# Patient Record
Sex: MALE | Race: WHITE | NOT HISPANIC OR LATINO | Employment: OTHER | URBAN - NONMETROPOLITAN AREA
[De-identification: names, ages, dates, MRNs, and addresses within clinical notes are randomized per-mention and may not be internally consistent; named-entity substitution may affect disease eponyms.]

---

## 2017-02-17 ENCOUNTER — OFFICE VISIT (OUTPATIENT)
Dept: CARDIOLOGY | Facility: CLINIC | Age: 72
End: 2017-02-17
Payer: MEDICARE

## 2017-02-17 VITALS
SYSTOLIC BLOOD PRESSURE: 104 MMHG | DIASTOLIC BLOOD PRESSURE: 60 MMHG | HEART RATE: 63 BPM | OXYGEN SATURATION: 95 % | HEIGHT: 68 IN | WEIGHT: 149 LBS | BODY MASS INDEX: 22.58 KG/M2

## 2017-02-17 DIAGNOSIS — J44.9 CHRONIC OBSTRUCTIVE PULMONARY DISEASE, UNSPECIFIED COPD TYPE (HCC): ICD-10-CM

## 2017-02-17 DIAGNOSIS — Z95.5 PRESENCE OF BARE METAL STENT IN LEFT CIRCUMFLEX CORONARY ARTERY: ICD-10-CM

## 2017-02-17 DIAGNOSIS — I25.10 CORONARY ARTERY DISEASE INVOLVING NATIVE CORONARY ARTERY OF NATIVE HEART WITHOUT ANGINA PECTORIS: ICD-10-CM

## 2017-02-17 DIAGNOSIS — D64.9 NORMOCYTIC ANEMIA: ICD-10-CM

## 2017-02-17 DIAGNOSIS — I21.21 ST ELEVATION MYOCARDIAL INFARCTION INVOLVING LEFT CIRCUMFLEX CORONARY ARTERY (HCC): ICD-10-CM

## 2017-02-17 DIAGNOSIS — I48.0 PAROXYSMAL ATRIAL FIBRILLATION (HCC): ICD-10-CM

## 2017-02-17 PROCEDURE — G8598 ASA/ANTIPLAT THER USED: HCPCS | Performed by: INTERNAL MEDICINE

## 2017-02-17 PROCEDURE — 4004F PT TOBACCO SCREEN RCVD TLK: CPT | Performed by: INTERNAL MEDICINE

## 2017-02-17 PROCEDURE — 1101F PT FALLS ASSESS-DOCD LE1/YR: CPT | Mod: 8P | Performed by: INTERNAL MEDICINE

## 2017-02-17 PROCEDURE — G8484 FLU IMMUNIZE NO ADMIN: HCPCS | Performed by: INTERNAL MEDICINE

## 2017-02-17 PROCEDURE — G8432 DEP SCR NOT DOC, RNG: HCPCS | Performed by: INTERNAL MEDICINE

## 2017-02-17 PROCEDURE — G8419 CALC BMI OUT NRM PARAM NOF/U: HCPCS | Performed by: INTERNAL MEDICINE

## 2017-02-17 PROCEDURE — 3017F COLORECTAL CA SCREEN DOC REV: CPT | Mod: 8P | Performed by: INTERNAL MEDICINE

## 2017-02-17 PROCEDURE — 4040F PNEUMOC VAC/ADMIN/RCVD: CPT | Mod: 8P | Performed by: INTERNAL MEDICINE

## 2017-02-17 PROCEDURE — 99215 OFFICE O/P EST HI 40 MIN: CPT | Performed by: INTERNAL MEDICINE

## 2017-02-17 RX ORDER — AMIODARONE HYDROCHLORIDE 200 MG/1
200 TABLET ORAL DAILY
Qty: 60 TAB | Refills: 11 | Status: SHIPPED | OUTPATIENT
Start: 2017-02-17

## 2017-02-17 ASSESSMENT — ENCOUNTER SYMPTOMS
MUSCULOSKELETAL NEGATIVE: 1
NEUROLOGICAL NEGATIVE: 1
CARDIOVASCULAR NEGATIVE: 1
RESPIRATORY NEGATIVE: 1
SPUTUM PRODUCTION: 0
CHILLS: 0
SORE THROAT: 0
HEMOPTYSIS: 0
COUGH: 0
SHORTNESS OF BREATH: 0
BRUISES/BLEEDS EASILY: 0
WHEEZING: 0
PND: 0
EYES NEGATIVE: 1
PALPITATIONS: 0
LOSS OF CONSCIOUSNESS: 0
GASTROINTESTINAL NEGATIVE: 1
DIZZINESS: 0
CONSTITUTIONAL NEGATIVE: 1
CLAUDICATION: 0
STRIDOR: 0
ORTHOPNEA: 0
WEAKNESS: 0
FEVER: 0

## 2017-02-17 NOTE — PROGRESS NOTES
Subjective:   Zion Love is a 71 y.o. male who presents today for a follow up for his CAD. Recent admission for urosepsis and was in atrial fibrillation during his stay.  He was discharged and readmitted for atrial fibrillation with RVR.  He was told to stop his clopidogrel and asa and start xarelto.  A few visits back we stopped his amiodarone.    Past Medical History   Diagnosis Date   • STEMI (ST elevation myocardial infarction) (CMS-HCC) 8/13/2016     Acute inferior posterior wall myocardial infarction, status post thrombolytic therapy with 99% residual stenosis with thrombus in the mid portion of the large left circumflex artery. Status post stenting of the left circumflex artery with a 4.5x18 mm bare metal Ultra stent with 0% residual stenosis, MYRA-3 flow.  30-40% left main stenosis and 50-60% stenosis of the mid medium-sized ramus intermediate artery.  Apical inferior wall akinesis.  Ejection fraction is 40-45%.     • Coronary artery disease involving native coronary artery 8/13/2016     Status post stenting of the left circumflex artery with a 4.5x18 mm bare metal Ultra stent with 0% residual stenosis, MYRA-3 flow.  30-40% left main stenosis and 50-60% stenosis of the mid medium-sized ramus intermediate artery.     • Presence of bare metal stent in left circumflex coronary artery 8/13/2016     4.5x18 mm Ultra stent    • Paroxysmal atrial fibrillation (CMS-HCC) 8/14/2016     During post MI recovery, treated with amiodarone.     Past Surgical History   Procedure Laterality Date   • Tonsillectomy     • Other orthopedic surgery Left      Left wrist synovial cyst resection     Family History   Problem Relation Age of Onset   • Alzheimer's Disease Mother    • Other Father      History   Smoking status   • Current Every Day Smoker -- 0.25 packs/day for 60 years   • Types: Cigarettes   Smokeless tobacco   • Never Used     No Known Allergies  Outpatient Encounter Prescriptions as of 2/17/2017   Medication Sig  "Dispense Refill   • temazepam (RESTORIL) 15 MG Cap Take 15 mg by mouth at bedtime as needed for Sleep.     • clopidogrel (PLAVIX) 75 MG Tab Take 1 Tab by mouth every day. 30 Tab 11   • aspirin (ASA) 81 MG Chew Tab chewable tablet Take 1 Tab by mouth every day. 100 Tab 11   • amiodarone (CORDARONE) 200 MG Tab Take 1 Tab by mouth every day. 30 Tab 3   • atorvastatin (LIPITOR) 40 MG Tab Take 1 Tab by mouth every evening. 30 Tab 11   • metoprolol (LOPRESSOR) 25 MG Tab Take 1 Tab by mouth 2 Times a Day. 60 Tab 11   • oxycodone immediate-release (ROXICODONE) 5 MG Tab Take 1 Tab by mouth every four hours as needed. 30 Tab 0   • acetaminophen (TYLENOL) 325 MG Tab Take 1 Tab by mouth every four hours as needed for Mild Pain. 30 Tab 0   • alprazolam (XANAX) 0.25 MG Tab Take 1 Tab by mouth every 12 hours as needed for Anxiety. 20 Tab 0     No facility-administered encounter medications on file as of 2/17/2017.     Review of Systems   Constitutional: Negative.  Negative for fever, chills and malaise/fatigue.   HENT: Negative.  Negative for sore throat.    Eyes: Negative.    Respiratory: Negative.  Negative for cough, hemoptysis, sputum production, shortness of breath, wheezing and stridor.    Cardiovascular: Negative.  Negative for chest pain, palpitations, orthopnea, claudication, leg swelling and PND.   Gastrointestinal: Negative.    Genitourinary: Negative.    Musculoskeletal: Negative.    Skin: Negative.    Neurological: Negative.  Negative for dizziness, loss of consciousness and weakness.   Endo/Heme/Allergies: Negative.  Does not bruise/bleed easily.   All other systems reviewed and are negative.       Objective:   /60 mmHg  Pulse 63  Ht 1.727 m (5' 8\")  Wt 67.586 kg (149 lb)  BMI 22.66 kg/m2  SpO2 95%    Physical Exam   Constitutional: He is oriented to person, place, and time. He appears well-developed and well-nourished. No distress.   HENT:   Head: Normocephalic.   Mouth/Throat: Oropharynx is clear and " moist.   Eyes: EOM are normal. Pupils are equal, round, and reactive to light. Right eye exhibits no discharge. Left eye exhibits no discharge. No scleral icterus.   Neck: Normal range of motion. Neck supple. No JVD present. No tracheal deviation present.   Cardiovascular: Normal rate, regular rhythm, S1 normal, S2 normal, normal heart sounds, intact distal pulses and normal pulses.  Exam reveals no gallop, no S3, no S4 and no friction rub.    No murmur heard.   No systolic murmur is present    No diastolic murmur is present   Pulses:       Carotid pulses are 2+ on the right side, and 2+ on the left side.       Radial pulses are 2+ on the right side, and 2+ on the left side.        Dorsalis pedis pulses are 2+ on the right side, and 2+ on the left side.        Posterior tibial pulses are 2+ on the right side, and 2+ on the left side.   Pulmonary/Chest: Effort normal and breath sounds normal. No respiratory distress. He has no wheezes. He has no rales.   Abdominal: Soft. Bowel sounds are normal. He exhibits no distension and no mass. There is no tenderness. There is no rebound and no guarding.   Musculoskeletal: He exhibits no edema.   Neurological: He is alert and oriented to person, place, and time. No cranial nerve deficit.   Skin: Skin is warm and dry. He is not diaphoretic. No pallor.   Psychiatric: He has a normal mood and affect. His behavior is normal. Judgment and thought content normal.   Nursing note and vitals reviewed.      Assessment:     1. ST elevation myocardial infarction involving left circumflex coronary artery (CMS-[facility])     2. Paroxysmal atrial fibrillation (CMS-[facility])     3. Presence of bare metal stent in left circumflex coronary artery     4. Coronary artery disease involving native coronary artery of native heart without angina pectoris     5. Normocytic anemia     6. Chronic obstructive pulmonary disease, unspecified COPD type (CMS-[facility])         Medical Decision Making:  Today's Assessment /  Status / Plan:     70 y/o M with CAD s/p inferior stemi and recurrent a-fib.  I offered antiarrythmic therapy but he does not want to be admitted for monitoring.      1. STEMI, inferior    - restart clopidogrel    - cont atorva 40    2. HLD    - per above    3. A-fib    - cont xarelto    - restart amio    4. Tob Use    - stop smoking    Thank for you allowing me to take part in your patient's care, please call should you have any questions or would like to discuss this patient.

## 2017-02-17 NOTE — Clinical Note
[facility], [place], operated by [facility]   [address] [place] [address]  Phone: [phone]  Fax: [phone]              Zion Love  1945    Encounter Date: 2/17/2017    Flaco Robertson M.D.          PROGRESS NOTE:  Subjective:   Zion Love is a 71 y.o. male who presents today for a follow up for his CAD. Recent admission for urosepsis and was in atrial fibrillation during his stay.  He was discharged and readmitted for atrial fibrillation with RVR.  He was told to stop his clopidogrel and asa and start xarelto.  A few visits back we stopped his amiodarone.    Past Medical History   Diagnosis Date   • STEMI (ST elevation myocardial infarction) (CMS-HCC) 8/13/2016     Acute inferior posterior wall myocardial infarction, status post thrombolytic therapy with 99% residual stenosis with thrombus in the mid portion of the large left circumflex artery. Status post stenting of the left circumflex artery with a 4.5x18 mm bare metal Ultra stent with 0% residual stenosis, MYRA-3 flow.  30-40% left main stenosis and 50-60% stenosis of the mid medium-sized ramus intermediate artery.  Apical inferior wall akinesis.  Ejection fraction is 40-45%.     • Coronary artery disease involving native coronary artery 8/13/2016     Status post stenting of the left circumflex artery with a 4.5x18 mm bare metal Ultra stent with 0% residual stenosis, MYRA-3 flow.  30-40% left main stenosis and 50-60% stenosis of the mid medium-sized ramus intermediate artery.     • Presence of bare metal stent in left circumflex coronary artery 8/13/2016     4.5x18 mm Ultra stent    • Paroxysmal atrial fibrillation (CMS-HCC) 8/14/2016     During post MI recovery, treated with amiodarone.     Past Surgical History   Procedure Laterality Date   • Tonsillectomy     • Other orthopedic surgery Left      Left wrist synovial cyst resection     Family History   Problem Relation Age of Onset   • Alzheimer's Disease  "Mother    • Other Father      History   Smoking status   • Current Every Day Smoker -- 0.25 packs/day for 60 years   • Types: Cigarettes   Smokeless tobacco   • Never Used     No Known Allergies  Outpatient Encounter Prescriptions as of 2/17/2017   Medication Sig Dispense Refill   • temazepam (RESTORIL) 15 MG Cap Take 15 mg by mouth at bedtime as needed for Sleep.     • clopidogrel (PLAVIX) 75 MG Tab Take 1 Tab by mouth every day. 30 Tab 11   • aspirin (ASA) 81 MG Chew Tab chewable tablet Take 1 Tab by mouth every day. 100 Tab 11   • amiodarone (CORDARONE) 200 MG Tab Take 1 Tab by mouth every day. 30 Tab 3   • atorvastatin (LIPITOR) 40 MG Tab Take 1 Tab by mouth every evening. 30 Tab 11   • metoprolol (LOPRESSOR) 25 MG Tab Take 1 Tab by mouth 2 Times a Day. 60 Tab 11   • oxycodone immediate-release (ROXICODONE) 5 MG Tab Take 1 Tab by mouth every four hours as needed. 30 Tab 0   • acetaminophen (TYLENOL) 325 MG Tab Take 1 Tab by mouth every four hours as needed for Mild Pain. 30 Tab 0   • alprazolam (XANAX) 0.25 MG Tab Take 1 Tab by mouth every 12 hours as needed for Anxiety. 20 Tab 0     No facility-administered encounter medications on file as of 2/17/2017.     Review of Systems   Constitutional: Negative.  Negative for fever, chills and malaise/fatigue.   HENT: Negative.  Negative for sore throat.    Eyes: Negative.    Respiratory: Negative.  Negative for cough, hemoptysis, sputum production, shortness of breath, wheezing and stridor.    Cardiovascular: Negative.  Negative for chest pain, palpitations, orthopnea, claudication, leg swelling and PND.   Gastrointestinal: Negative.    Genitourinary: Negative.    Musculoskeletal: Negative.    Skin: Negative.    Neurological: Negative.  Negative for dizziness, loss of consciousness and weakness.   Endo/Heme/Allergies: Negative.  Does not bruise/bleed easily.   All other systems reviewed and are negative.       Objective:   /60 mmHg  Pulse 63  Ht 1.727 m (5' 8\") "  Wt 67.586 kg (149 lb)  BMI 22.66 kg/m2  SpO2 95%    Physical Exam   Constitutional: He is oriented to person, place, and time. He appears well-developed and well-nourished. No distress.   HENT:   Head: Normocephalic.   Mouth/Throat: Oropharynx is clear and moist.   Eyes: EOM are normal. Pupils are equal, round, and reactive to light. Right eye exhibits no discharge. Left eye exhibits no discharge. No scleral icterus.   Neck: Normal range of motion. Neck supple. No JVD present. No tracheal deviation present.   Cardiovascular: Normal rate, regular rhythm, S1 normal, S2 normal, normal heart sounds, intact distal pulses and normal pulses.  Exam reveals no gallop, no S3, no S4 and no friction rub.    No murmur heard.   No systolic murmur is present    No diastolic murmur is present   Pulses:       Carotid pulses are 2+ on the right side, and 2+ on the left side.       Radial pulses are 2+ on the right side, and 2+ on the left side.        Dorsalis pedis pulses are 2+ on the right side, and 2+ on the left side.        Posterior tibial pulses are 2+ on the right side, and 2+ on the left side.   Pulmonary/Chest: Effort normal and breath sounds normal. No respiratory distress. He has no wheezes. He has no rales.   Abdominal: Soft. Bowel sounds are normal. He exhibits no distension and no mass. There is no tenderness. There is no rebound and no guarding.   Musculoskeletal: He exhibits no edema.   Neurological: He is alert and oriented to person, place, and time. No cranial nerve deficit.   Skin: Skin is warm and dry. He is not diaphoretic. No pallor.   Psychiatric: He has a normal mood and affect. His behavior is normal. Judgment and thought content normal.   Nursing note and vitals reviewed.      Assessment:     1. ST elevation myocardial infarction involving left circumflex coronary artery (CMS-HCC)     2. Paroxysmal atrial fibrillation (CMS-HCC)     3. Presence of bare metal stent in left circumflex coronary artery        4. Coronary artery disease involving native coronary artery of native heart without angina pectoris     5. Normocytic anemia     6. Chronic obstructive pulmonary disease, unspecified COPD type (CMS-HCC)         Medical Decision Making:  Today's Assessment / Status / Plan:     70 y/o M with CAD s/p inferior stemi and recurrent a-fib.  I offered antiarrythmic therapy but he does not want to be admitted for monitoring.      1. STEMI, inferior    - restart clopidogrel    - cont atorva 40    2. HLD    - per above    3. A-fib    - cont xarelto    - restart amio    4. Tob Use    - stop smoking    Thank for you allowing me to take part in your patient's care, please call should you have any questions or would like to discuss this patient.        Macrina Arenas M.D.  [address] [facility] [address]  VIA Facsimile: [phone]

## 2017-02-27 ENCOUNTER — TELEPHONE (OUTPATIENT)
Dept: CARDIOLOGY | Facility: MEDICAL CENTER | Age: 72
End: 2017-02-27

## 2017-02-27 NOTE — TELEPHONE ENCOUNTER
Mckenzie called back and verified medications and dosing of Amiodarone as directed on 2/17.  400 mg BID x 14 days, 400 mg daily x 14 days then 200mg daily.

## 2017-02-27 NOTE — TELEPHONE ENCOUNTER
"Message left for daughter \"Mckenzie\" to call  Per office note Dr. Robertson pt was instructed to  Restart Clopidogrel  Continue Xarelto  Restart Amiodarone.  "

## 2017-02-27 NOTE — TELEPHONE ENCOUNTER
----- Message from Brenda Norwood, Med Ass't sent at 2/27/2017 10:27 AM PST -----  Regarding: Medication Question   Contact: [phone]  Patient of RO daughter Mckenzie would like to confirm that patient is to start Plavix and Amiodarone (CORDARONE) 200 MG Tab twice a day.    Thank you

## 2017-05-12 ENCOUNTER — OFFICE VISIT (OUTPATIENT)
Dept: CARDIOLOGY | Facility: CLINIC | Age: 72
End: 2017-05-12
Payer: MEDICARE

## 2017-05-12 VITALS
WEIGHT: 141 LBS | HEART RATE: 60 BPM | HEIGHT: 68 IN | DIASTOLIC BLOOD PRESSURE: 60 MMHG | SYSTOLIC BLOOD PRESSURE: 100 MMHG | BODY MASS INDEX: 21.37 KG/M2

## 2017-05-12 DIAGNOSIS — I25.10 CORONARY ARTERY DISEASE INVOLVING NATIVE CORONARY ARTERY OF NATIVE HEART WITHOUT ANGINA PECTORIS: ICD-10-CM

## 2017-05-12 DIAGNOSIS — J44.9 CHRONIC OBSTRUCTIVE PULMONARY DISEASE, UNSPECIFIED COPD TYPE (HCC): ICD-10-CM

## 2017-05-12 DIAGNOSIS — Z95.5 PRESENCE OF BARE METAL STENT IN LEFT CIRCUMFLEX CORONARY ARTERY: ICD-10-CM

## 2017-05-12 DIAGNOSIS — D64.9 NORMOCYTIC ANEMIA: ICD-10-CM

## 2017-05-12 DIAGNOSIS — I48.0 PAROXYSMAL ATRIAL FIBRILLATION (HCC): ICD-10-CM

## 2017-05-12 DIAGNOSIS — I21.21 ST ELEVATION MYOCARDIAL INFARCTION INVOLVING LEFT CIRCUMFLEX CORONARY ARTERY (HCC): ICD-10-CM

## 2017-05-12 PROCEDURE — G8598 ASA/ANTIPLAT THER USED: HCPCS | Performed by: INTERNAL MEDICINE

## 2017-05-12 PROCEDURE — 3017F COLORECTAL CA SCREEN DOC REV: CPT | Mod: 8P | Performed by: INTERNAL MEDICINE

## 2017-05-12 PROCEDURE — 4004F PT TOBACCO SCREEN RCVD TLK: CPT | Performed by: INTERNAL MEDICINE

## 2017-05-12 PROCEDURE — G8420 CALC BMI NORM PARAMETERS: HCPCS | Performed by: INTERNAL MEDICINE

## 2017-05-12 PROCEDURE — 99214 OFFICE O/P EST MOD 30 MIN: CPT | Performed by: INTERNAL MEDICINE

## 2017-05-12 PROCEDURE — 4040F PNEUMOC VAC/ADMIN/RCVD: CPT | Mod: 8P | Performed by: INTERNAL MEDICINE

## 2017-05-12 PROCEDURE — G8432 DEP SCR NOT DOC, RNG: HCPCS | Performed by: INTERNAL MEDICINE

## 2017-05-12 PROCEDURE — 1101F PT FALLS ASSESS-DOCD LE1/YR: CPT | Mod: 8P | Performed by: INTERNAL MEDICINE

## 2017-05-12 ASSESSMENT — ENCOUNTER SYMPTOMS
CLAUDICATION: 0
BRUISES/BLEEDS EASILY: 0
STRIDOR: 0
WHEEZING: 0
MUSCULOSKELETAL NEGATIVE: 1
RESPIRATORY NEGATIVE: 1
CHILLS: 0
HEMOPTYSIS: 0
FEVER: 0
SORE THROAT: 0
DIZZINESS: 0
EYES NEGATIVE: 1
PND: 0
PALPITATIONS: 0
GASTROINTESTINAL NEGATIVE: 1
NEUROLOGICAL NEGATIVE: 1
ORTHOPNEA: 0
COUGH: 0
LOSS OF CONSCIOUSNESS: 0
WEAKNESS: 0
CARDIOVASCULAR NEGATIVE: 1
CONSTITUTIONAL NEGATIVE: 1
SHORTNESS OF BREATH: 0
SPUTUM PRODUCTION: 0

## 2017-05-12 NOTE — PROGRESS NOTES
Subjective:   Zion Love is a 71 y.o. male who presents today for his STEMI and his a-fib.  He is taking his eliquis but having issues affording it.  He wants to knowif he needs to be taking it.  He restarted his amio for his a-fib.  He is still smoking.  Otherwise no chest pain, PND, orthopnea.    Past Medical History   Diagnosis Date   • STEMI (ST elevation myocardial infarction) (CMS-HCC) 8/13/2016     Acute inferior posterior wall myocardial infarction, status post thrombolytic therapy with 99% residual stenosis with thrombus in the mid portion of the large left circumflex artery. Status post stenting of the left circumflex artery with a 4.5x18 mm bare metal Ultra stent with 0% residual stenosis, MYRA-3 flow.  30-40% left main stenosis and 50-60% stenosis of the mid medium-sized ramus intermediate artery.  Apical inferior wall akinesis.  Ejection fraction is 40-45%.     • Coronary artery disease involving native coronary artery 8/13/2016     Status post stenting of the left circumflex artery with a 4.5x18 mm bare metal Ultra stent with 0% residual stenosis, MYRA-3 flow.  30-40% left main stenosis and 50-60% stenosis of the mid medium-sized ramus intermediate artery.     • Presence of bare metal stent in left circumflex coronary artery 8/13/2016     4.5x18 mm Ultra stent    • Paroxysmal atrial fibrillation (CMS-HCC) 8/14/2016     During post MI recovery, treated with amiodarone.     Past Surgical History   Procedure Laterality Date   • Tonsillectomy     • Other orthopedic surgery Left      Left wrist synovial cyst resection     Family History   Problem Relation Age of Onset   • Alzheimer's Disease Mother    • Other Father      History   Smoking status   • Current Every Day Smoker -- 0.25 packs/day for 60 years   • Types: Cigarettes   Smokeless tobacco   • Never Used     No Known Allergies  Outpatient Encounter Prescriptions as of 5/12/2017   Medication Sig Dispense Refill   • clopidogrel (PLAVIX) 75 MG Tab Take  "1 Tab by mouth every day. 30 Tab 11   • amiodarone (CORDARONE) 200 MG Tab Take 1 Tab by mouth every day. 30 Tab 3   • atorvastatin (LIPITOR) 40 MG Tab Take 1 Tab by mouth every evening. 30 Tab 11   • metoprolol (LOPRESSOR) 25 MG Tab Take 1 Tab by mouth 2 Times a Day. (Patient taking differently: Take 37.5 mg by mouth 2 Times a Day.) 60 Tab 11   • acetaminophen (TYLENOL) 325 MG Tab Take 1 Tab by mouth every four hours as needed for Mild Pain. 30 Tab 0   • amiodarone (CORDARONE) 200 MG Tab Take 1 Tab by mouth every day. 60 Tab 11   • temazepam (RESTORIL) 15 MG Cap Take 15 mg by mouth at bedtime as needed for Sleep.     • aspirin (ASA) 81 MG Chew Tab chewable tablet Take 1 Tab by mouth every day. 100 Tab 11   • oxycodone immediate-release (ROXICODONE) 5 MG Tab Take 1 Tab by mouth every four hours as needed. 30 Tab 0   • alprazolam (XANAX) 0.25 MG Tab Take 1 Tab by mouth every 12 hours as needed for Anxiety. 20 Tab 0     No facility-administered encounter medications on file as of 5/12/2017.     Review of Systems   Constitutional: Negative.  Negative for fever, chills and malaise/fatigue.   HENT: Negative.  Negative for sore throat.    Eyes: Negative.    Respiratory: Negative.  Negative for cough, hemoptysis, sputum production, shortness of breath, wheezing and stridor.    Cardiovascular: Negative.  Negative for chest pain, palpitations, orthopnea, claudication, leg swelling and PND.   Gastrointestinal: Negative.    Genitourinary: Negative.    Musculoskeletal: Negative.    Skin: Negative.    Neurological: Negative.  Negative for dizziness, loss of consciousness and weakness.   Endo/Heme/Allergies: Negative.  Does not bruise/bleed easily.   All other systems reviewed and are negative.       Objective:   /60 mmHg  Pulse 60  Ht 1.727 m (5' 8\")  Wt 63.957 kg (141 lb)  BMI 21.44 kg/m2    Physical Exam   Constitutional: He is oriented to person, place, and time. He appears well-developed and well-nourished. No " distress.   HENT:   Head: Normocephalic.   Mouth/Throat: Oropharynx is clear and moist.   Eyes: EOM are normal. Pupils are equal, round, and reactive to light. Right eye exhibits no discharge. Left eye exhibits no discharge. No scleral icterus.   Neck: Normal range of motion. Neck supple. No JVD present. No tracheal deviation present.   Cardiovascular: Normal rate, regular rhythm, S1 normal, S2 normal, normal heart sounds, intact distal pulses and normal pulses.  Exam reveals no gallop, no S3, no S4 and no friction rub.    No murmur heard.   No systolic murmur is present    No diastolic murmur is present   Pulses:       Carotid pulses are 2+ on the right side, and 2+ on the left side.       Radial pulses are 2+ on the right side, and 2+ on the left side.        Dorsalis pedis pulses are 2+ on the right side, and 2+ on the left side.        Posterior tibial pulses are 2+ on the right side, and 2+ on the left side.   Pulmonary/Chest: Effort normal and breath sounds normal. No respiratory distress. He has no wheezes. He has no rales.   Abdominal: Soft. Bowel sounds are normal. He exhibits no distension and no mass. There is no tenderness. There is no rebound and no guarding.   Musculoskeletal: He exhibits no edema.   Neurological: He is alert and oriented to person, place, and time. No cranial nerve deficit.   Skin: Skin is warm and dry. He is not diaphoretic. No pallor.   Psychiatric: He has a normal mood and affect. His behavior is normal. Judgment and thought content normal.   Nursing note and vitals reviewed.      Assessment:     1. ST elevation myocardial infarction involving left circumflex coronary artery (CMS-[facility])     2. Normocytic anemia     3. Chronic obstructive pulmonary disease, unspecified COPD type (CMS-[facility])     4. Coronary artery disease involving native coronary artery of native heart without angina pectoris     5. Paroxysmal atrial fibrillation (CMS-[facility])     6. Presence of bare metal stent in left  circumflex coronary artery         Medical Decision Making:  Today's Assessment / Status / Plan:     70 y/o M with p-a-fib and CAD s/ STEMI.  He will stay on cloipidogrel for one year.  I did give them a Rx for both dabigitran and apixaban to run through their insurance.  They will continue to work on the patient assisance program.  I will see him back in 6 months.    1. STEMI, inferior    - cont clopidogrel until 8/2017    - cont atorva 40    2. HLD    - per above    3. A-fib    - cont xarelto    - contn amio    - they will advise if he wants to switch    4. Tob Use    - stop smoking    Thank for you allowing me to take part in your patient's care, please call should you have any questions or would like to discuss this patient.

## 2017-05-12 NOTE — Clinical Note
[facility]   [address] [place] [address]  Phone: [phone]  Fax: [phone]              Zion Love  1945    Encounter Date: 5/12/2017    Flaco Robertson M.D.          PROGRESS NOTE:  Subjective:   Zion Love is a 71 y.o. male who presents today for his STEMI and his a-fib.  He is taking his eliquis but having issues affording it.  He wants to knowif he needs to be taking it.  He restarted his amio for his a-fib.  He is still smoking.  Otherwise no chest pain, PND, orthopnea.    Past Medical History   Diagnosis Date   • STEMI (ST elevation myocardial infarction) (CMS-HCC) 8/13/2016     Acute inferior posterior wall myocardial infarction, status post thrombolytic therapy with 99% residual stenosis with thrombus in the mid portion of the large left circumflex artery. Status post stenting of the left circumflex artery with a 4.5x18 mm bare metal Ultra stent with 0% residual stenosis, MYRA-3 flow.  30-40% left main stenosis and 50-60% stenosis of the mid medium-sized ramus intermediate artery.  Apical inferior wall akinesis.  Ejection fraction is 40-45%.     • Coronary artery disease involving native coronary artery 8/13/2016     Status post stenting of the left circumflex artery with a 4.5x18 mm bare metal Ultra stent with 0% residual stenosis, MYRA-3 flow.  30-40% left main stenosis and 50-60% stenosis of the mid medium-sized ramus intermediate artery.     • Presence of bare metal stent in left circumflex coronary artery 8/13/2016     4.5x18 mm Ultra stent    • Paroxysmal atrial fibrillation (CMS-HCC) 8/14/2016     During post MI recovery, treated with amiodarone.     Past Surgical History   Procedure Laterality Date   • Tonsillectomy     • Other orthopedic surgery Left      Left wrist synovial cyst resection     Family History   Problem Relation Age of Onset   • Alzheimer's Disease Mother    • Other Father      History   Smoking  status   • Current Every Day Smoker -- 0.25 packs/day for 60 years   • Types: Cigarettes   Smokeless tobacco   • Never Used     No Known Allergies  Outpatient Encounter Prescriptions as of 5/12/2017   Medication Sig Dispense Refill   • clopidogrel (PLAVIX) 75 MG Tab Take 1 Tab by mouth every day. 30 Tab 11   • amiodarone (CORDARONE) 200 MG Tab Take 1 Tab by mouth every day. 30 Tab 3   • atorvastatin (LIPITOR) 40 MG Tab Take 1 Tab by mouth every evening. 30 Tab 11   • metoprolol (LOPRESSOR) 25 MG Tab Take 1 Tab by mouth 2 Times a Day. (Patient taking differently: Take 37.5 mg by mouth 2 Times a Day.) 60 Tab 11   • acetaminophen (TYLENOL) 325 MG Tab Take 1 Tab by mouth every four hours as needed for Mild Pain. 30 Tab 0   • amiodarone (CORDARONE) 200 MG Tab Take 1 Tab by mouth every day. 60 Tab 11   • temazepam (RESTORIL) 15 MG Cap Take 15 mg by mouth at bedtime as needed for Sleep.     • aspirin (ASA) 81 MG Chew Tab chewable tablet Take 1 Tab by mouth every day. 100 Tab 11   • oxycodone immediate-release (ROXICODONE) 5 MG Tab Take 1 Tab by mouth every four hours as needed. 30 Tab 0   • alprazolam (XANAX) 0.25 MG Tab Take 1 Tab by mouth every 12 hours as needed for Anxiety. 20 Tab 0     No facility-administered encounter medications on file as of 5/12/2017.     Review of Systems   Constitutional: Negative.  Negative for fever, chills and malaise/fatigue.   HENT: Negative.  Negative for sore throat.    Eyes: Negative.    Respiratory: Negative.  Negative for cough, hemoptysis, sputum production, shortness of breath, wheezing and stridor.    Cardiovascular: Negative.  Negative for chest pain, palpitations, orthopnea, claudication, leg swelling and PND.   Gastrointestinal: Negative.    Genitourinary: Negative.    Musculoskeletal: Negative.    Skin: Negative.    Neurological: Negative.  Negative for dizziness, loss of consciousness and weakness.   Endo/Heme/Allergies: Negative.  Does not bruise/bleed easily.   All other  "systems reviewed and are negative.       Objective:   /60 mmHg  Pulse 60  Ht 1.727 m (5' 8\")  Wt 63.957 kg (141 lb)  BMI 21.44 kg/m2    Physical Exam   Constitutional: He is oriented to person, place, and time. He appears well-developed and well-nourished. No distress.   HENT:   Head: Normocephalic.   Mouth/Throat: Oropharynx is clear and moist.   Eyes: EOM are normal. Pupils are equal, round, and reactive to light. Right eye exhibits no discharge. Left eye exhibits no discharge. No scleral icterus.   Neck: Normal range of motion. Neck supple. No JVD present. No tracheal deviation present.   Cardiovascular: Normal rate, regular rhythm, S1 normal, S2 normal, normal heart sounds, intact distal pulses and normal pulses.  Exam reveals no gallop, no S3, no S4 and no friction rub.    No murmur heard.   No systolic murmur is present    No diastolic murmur is present   Pulses:       Carotid pulses are 2+ on the right side, and 2+ on the left side.       Radial pulses are 2+ on the right side, and 2+ on the left side.        Dorsalis pedis pulses are 2+ on the right side, and 2+ on the left side.        Posterior tibial pulses are 2+ on the right side, and 2+ on the left side.   Pulmonary/Chest: Effort normal and breath sounds normal. No respiratory distress. He has no wheezes. He has no rales.   Abdominal: Soft. Bowel sounds are normal. He exhibits no distension and no mass. There is no tenderness. There is no rebound and no guarding.   Musculoskeletal: He exhibits no edema.   Neurological: He is alert and oriented to person, place, and time. No cranial nerve deficit.   Skin: Skin is warm and dry. He is not diaphoretic. No pallor.   Psychiatric: He has a normal mood and affect. His behavior is normal. Judgment and thought content normal.   Nursing note and vitals reviewed.      Assessment:     1. ST elevation myocardial infarction involving left circumflex coronary artery (CMS-HCC)     2. Normocytic anemia     3. " Chronic obstructive pulmonary disease, unspecified COPD type (CMS-HCC)     4. Coronary artery disease involving native coronary artery of native heart without angina pectoris     5. Paroxysmal atrial fibrillation (CMS-HCC)     6. Presence of bare metal stent in left circumflex coronary artery         Medical Decision Making:  Today's Assessment / Status / Plan:     70 y/o M with p-a-fib and CAD s/ STEMI.  He will stay on cloipidogrel for one year.  I did give them a Rx for both dabigitran and apixaban to run through their insurance.  They will continue to work on the patient assisance program.  I will see him back in 6 months.    1. STEMI, inferior    - cont clopidogrel until 8/2017    - cont atorva 40    2. HLD    - per above    3. A-fib    - cont xarelto    - contn amio    - they will advise if he wants to switch    4. Tob Use    - stop smoking    Thank for you allowing me to take part in your patient's care, please call should you have any questions or would like to discuss this patient.      Macrina Arenas M.D.  [address] [facility] [address]  VIA Facsimile: [phone]

## 2017-05-15 ENCOUNTER — TELEPHONE (OUTPATIENT)
Dept: CARDIOLOGY | Facility: MEDICAL CENTER | Age: 72
End: 2017-05-15

## 2017-05-15 NOTE — TELEPHONE ENCOUNTER
----- Message from Marcos Styles, Med Ass't sent at 5/15/2017 11:03 AM PDT -----  Regarding: Medication Questions  Contact: [phone]  Eugene Boss Pt Zion's daughter Cheryle called with some questions re his medications he got on 5/12/17. She did request a call back at your earliest convenience.     Thanks!  Marcos

## 2017-05-15 NOTE — TELEPHONE ENCOUNTER
Spoke with Cheryle who is working on getting pricing for Pradaxa and Eliquis to see if either is less expensive than Xarelto which he is currently on.  She called to verify that Pradaxa and Eliquis are both BID dosing.  She has been exploring the patient assistance programs as well.

## 2017-06-23 ENCOUNTER — OFFICE VISIT (OUTPATIENT)
Dept: CARDIOLOGY | Facility: CLINIC | Age: 72
End: 2017-06-23
Payer: MEDICARE

## 2017-06-23 VITALS
BODY MASS INDEX: 21.22 KG/M2 | WEIGHT: 140 LBS | SYSTOLIC BLOOD PRESSURE: 90 MMHG | HEIGHT: 68 IN | HEART RATE: 60 BPM | DIASTOLIC BLOOD PRESSURE: 60 MMHG

## 2017-06-23 DIAGNOSIS — I21.21 ST ELEVATION MYOCARDIAL INFARCTION INVOLVING LEFT CIRCUMFLEX CORONARY ARTERY (HCC): ICD-10-CM

## 2017-06-23 DIAGNOSIS — I50.21 ACUTE SYSTOLIC CONGESTIVE HEART FAILURE (HCC): ICD-10-CM

## 2017-06-23 DIAGNOSIS — Z95.5 PRESENCE OF BARE METAL STENT IN LEFT CIRCUMFLEX CORONARY ARTERY: ICD-10-CM

## 2017-06-23 DIAGNOSIS — I48.0 PAROXYSMAL ATRIAL FIBRILLATION (HCC): ICD-10-CM

## 2017-06-23 DIAGNOSIS — D64.9 NORMOCYTIC ANEMIA: ICD-10-CM

## 2017-06-23 DIAGNOSIS — J44.9 CHRONIC OBSTRUCTIVE PULMONARY DISEASE, UNSPECIFIED COPD TYPE (HCC): ICD-10-CM

## 2017-06-23 DIAGNOSIS — I21.3 ST ELEVATION MYOCARDIAL INFARCTION (STEMI), UNSPECIFIED ARTERY (HCC): ICD-10-CM

## 2017-06-23 DIAGNOSIS — I25.10 CORONARY ARTERY DISEASE INVOLVING NATIVE CORONARY ARTERY OF NATIVE HEART WITHOUT ANGINA PECTORIS: ICD-10-CM

## 2017-06-23 PROCEDURE — 99214 OFFICE O/P EST MOD 30 MIN: CPT | Performed by: INTERNAL MEDICINE

## 2017-06-23 ASSESSMENT — ENCOUNTER SYMPTOMS
CONSTITUTIONAL NEGATIVE: 1
LOSS OF CONSCIOUSNESS: 0
ORTHOPNEA: 0
NEUROLOGICAL NEGATIVE: 1
STRIDOR: 0
MUSCULOSKELETAL NEGATIVE: 1
SHORTNESS OF BREATH: 0
DIZZINESS: 0
PND: 0
CARDIOVASCULAR NEGATIVE: 1
RESPIRATORY NEGATIVE: 1
HEMOPTYSIS: 0
FEVER: 0
COUGH: 0
GASTROINTESTINAL NEGATIVE: 1
SPUTUM PRODUCTION: 0
CHILLS: 0
PALPITATIONS: 0
BRUISES/BLEEDS EASILY: 0
WEAKNESS: 0
WHEEZING: 0
SORE THROAT: 0
EYES NEGATIVE: 1
CLAUDICATION: 0

## 2017-06-23 NOTE — Clinical Note
[facility] and V[facility]   [address] [place] [address]  Phone: [phone]  Fax: [phone]              Zion Love  1945    Encounter Date: 6/23/2017    Flaco Robertson M.D.          PROGRESS NOTE:  Subjective:   Zion Love is a 71 y.o. male who presents today for his CAD and his atrial fibrillation.  His BP has settled down and is now well controlled at home.  He has not been having any palpitations on chronic amio.  He is still smoking 1 pack per 3-4 days.  No chest pain or other symptoms.    Past Medical History   Diagnosis Date   • STEMI (ST elevation myocardial infarction) (CMS-HCC) 8/13/2016     Acute inferior posterior wall myocardial infarction, status post thrombolytic therapy with 99% residual stenosis with thrombus in the mid portion of the large left circumflex artery. Status post stenting of the left circumflex artery with a 4.5x18 mm bare metal Ultra stent with 0% residual stenosis, MYRA-3 flow.  30-40% left main stenosis and 50-60% stenosis of the mid medium-sized ramus intermediate artery.  Apical inferior wall akinesis.  Ejection fraction is 40-45%.     • Coronary artery disease involving native coronary artery 8/13/2016     Status post stenting of the left circumflex artery with a 4.5x18 mm bare metal Ultra stent with 0% residual stenosis, MYRA-3 flow.  30-40% left main stenosis and 50-60% stenosis of the mid medium-sized ramus intermediate artery.     • Presence of bare metal stent in left circumflex coronary artery 8/13/2016     4.5x18 mm Ultra stent    • Paroxysmal atrial fibrillation (CMS-HCC) 8/14/2016     During post MI recovery, treated with amiodarone.     Past Surgical History   Procedure Laterality Date   • Tonsillectomy     • Other orthopedic surgery Left      Left wrist synovial cyst resection     Family History   Problem Relation Age of Onset   • Alzheimer's Disease Mother    • Other Father      History   Smoking  status   • Current Every Day Smoker -- 0.25 packs/day for 60 years   • Types: Cigarettes   Smokeless tobacco   • Never Used     No Known Allergies  Outpatient Encounter Prescriptions as of 6/23/2017   Medication Sig Dispense Refill   • amiodarone (CORDARONE) 200 MG Tab Take 1 Tab by mouth every day. 60 Tab 11   • temazepam (RESTORIL) 15 MG Cap Take 15 mg by mouth at bedtime as needed for Sleep.     • clopidogrel (PLAVIX) 75 MG Tab Take 1 Tab by mouth every day. 30 Tab 11   • aspirin (ASA) 81 MG Chew Tab chewable tablet Take 1 Tab by mouth every day. 100 Tab 11   • amiodarone (CORDARONE) 200 MG Tab Take 1 Tab by mouth every day. 30 Tab 3   • atorvastatin (LIPITOR) 40 MG Tab Take 1 Tab by mouth every evening. 30 Tab 11   • metoprolol (LOPRESSOR) 25 MG Tab Take 1 Tab by mouth 2 Times a Day. (Patient taking differently: Take 37.5 mg by mouth 2 Times a Day.) 60 Tab 11   • oxycodone immediate-release (ROXICODONE) 5 MG Tab Take 1 Tab by mouth every four hours as needed. 30 Tab 0   • acetaminophen (TYLENOL) 325 MG Tab Take 1 Tab by mouth every four hours as needed for Mild Pain. 30 Tab 0   • alprazolam (XANAX) 0.25 MG Tab Take 1 Tab by mouth every 12 hours as needed for Anxiety. 20 Tab 0     No facility-administered encounter medications on file as of 6/23/2017.     Review of Systems   Constitutional: Negative.  Negative for fever, chills and malaise/fatigue.   HENT: Negative.  Negative for sore throat.    Eyes: Negative.    Respiratory: Negative.  Negative for cough, hemoptysis, sputum production, shortness of breath, wheezing and stridor.    Cardiovascular: Negative.  Negative for chest pain, palpitations, orthopnea, claudication, leg swelling and PND.   Gastrointestinal: Negative.    Genitourinary: Negative.    Musculoskeletal: Negative.    Skin: Negative.    Neurological: Negative.  Negative for dizziness, loss of consciousness and weakness.   Endo/Heme/Allergies: Negative.  Does not bruise/bleed easily.   All other  "systems reviewed and are negative.       Objective:   BP 90/60 mmHg  Pulse 60  Ht 1.727 m (5' 8\")  Wt 63.504 kg (140 lb)  BMI 21.29 kg/m2    Physical Exam   Constitutional: He is oriented to person, place, and time. He appears well-developed and well-nourished. No distress.   HENT:   Head: Normocephalic.   Mouth/Throat: Oropharynx is clear and moist.   Eyes: EOM are normal. Pupils are equal, round, and reactive to light. Right eye exhibits no discharge. Left eye exhibits no discharge. No scleral icterus.   Neck: Normal range of motion. Neck supple. No JVD present. No tracheal deviation present.   Cardiovascular: Normal rate, regular rhythm, S1 normal, S2 normal, normal heart sounds, intact distal pulses and normal pulses.  Exam reveals no gallop, no S3, no S4 and no friction rub.    No murmur heard.   No systolic murmur is present    No diastolic murmur is present   Pulses:       Carotid pulses are 2+ on the right side, and 2+ on the left side.       Radial pulses are 2+ on the right side, and 2+ on the left side.        Dorsalis pedis pulses are 2+ on the right side, and 2+ on the left side.        Posterior tibial pulses are 2+ on the right side, and 2+ on the left side.   Pulmonary/Chest: Effort normal and breath sounds normal. No respiratory distress. He has no wheezes. He has no rales.   Abdominal: Soft. Bowel sounds are normal. He exhibits no distension and no mass. There is no tenderness. There is no rebound and no guarding.   Musculoskeletal: He exhibits no edema.   Neurological: He is alert and oriented to person, place, and time. No cranial nerve deficit.   Skin: Skin is warm and dry. He is not diaphoretic. No pallor.   Psychiatric: He has a normal mood and affect. His behavior is normal. Judgment and thought content normal.   Nursing note and vitals reviewed.      Assessment:     1. ST elevation myocardial infarction involving left circumflex coronary artery (CMS-HCC)     2. Normocytic anemia     3. " Chronic obstructive pulmonary disease, unspecified COPD type (CMS-HCC)     4. Coronary artery disease involving native coronary artery of native heart without angina pectoris     5. Presence of bare metal stent in left circumflex coronary artery     6. Paroxysmal atrial fibrillation (CMS-HCC)         Medical Decision Making:  Today's Assessment / Status / Plan:     70 y/o M with p-a-fib and CAD s/ STEMI.  He will stay on cloipidogrel for one year.  He will  a sample Rx for Xarelto.  We will check his LFTs and TSh today.    1. STEMI, inferior    - cont clopidogrel until 8/2017    - cont atorva 40    2. HLD    - per above    3. A-fib    - cont xarelto    - contn amio    - they will advise if he wants to switch    4. Tob Use    - stop smoking    Thank for you allowing me to take part in your patient's care, please call should you have any questions or would like to discuss this patient.      Macrina Arenas M.D.  [address] [facility] [address]  VIA Facsimile: [phone]

## 2017-06-23 NOTE — PROGRESS NOTES
Subjective:   Zion Love is a 71 y.o. male who presents today for his CAD and his atrial fibrillation.  His BP has settled down and is now well controlled at home.  He has not been having any palpitations on chronic amio.  He is still smoking 1 pack per 3-4 days.  No chest pain or other symptoms.    Past Medical History   Diagnosis Date   • STEMI (ST elevation myocardial infarction) (CMS-HCC) 8/13/2016     Acute inferior posterior wall myocardial infarction, status post thrombolytic therapy with 99% residual stenosis with thrombus in the mid portion of the large left circumflex artery. Status post stenting of the left circumflex artery with a 4.5x18 mm bare metal Ultra stent with 0% residual stenosis, MYRA-3 flow.  30-40% left main stenosis and 50-60% stenosis of the mid medium-sized ramus intermediate artery.  Apical inferior wall akinesis.  Ejection fraction is 40-45%.     • Coronary artery disease involving native coronary artery 8/13/2016     Status post stenting of the left circumflex artery with a 4.5x18 mm bare metal Ultra stent with 0% residual stenosis, MYRA-3 flow.  30-40% left main stenosis and 50-60% stenosis of the mid medium-sized ramus intermediate artery.     • Presence of bare metal stent in left circumflex coronary artery 8/13/2016     4.5x18 mm Ultra stent    • Paroxysmal atrial fibrillation (CMS-HCC) 8/14/2016     During post MI recovery, treated with amiodarone.     Past Surgical History   Procedure Laterality Date   • Tonsillectomy     • Other orthopedic surgery Left      Left wrist synovial cyst resection     Family History   Problem Relation Age of Onset   • Alzheimer's Disease Mother    • Other Father      History   Smoking status   • Current Every Day Smoker -- 0.25 packs/day for 60 years   • Types: Cigarettes   Smokeless tobacco   • Never Used     No Known Allergies  Outpatient Encounter Prescriptions as of 6/23/2017   Medication Sig Dispense Refill   • amiodarone (CORDARONE) 200 MG Tab  "Take 1 Tab by mouth every day. 60 Tab 11   • temazepam (RESTORIL) 15 MG Cap Take 15 mg by mouth at bedtime as needed for Sleep.     • clopidogrel (PLAVIX) 75 MG Tab Take 1 Tab by mouth every day. 30 Tab 11   • aspirin (ASA) 81 MG Chew Tab chewable tablet Take 1 Tab by mouth every day. 100 Tab 11   • amiodarone (CORDARONE) 200 MG Tab Take 1 Tab by mouth every day. 30 Tab 3   • atorvastatin (LIPITOR) 40 MG Tab Take 1 Tab by mouth every evening. 30 Tab 11   • metoprolol (LOPRESSOR) 25 MG Tab Take 1 Tab by mouth 2 Times a Day. (Patient taking differently: Take 37.5 mg by mouth 2 Times a Day.) 60 Tab 11   • oxycodone immediate-release (ROXICODONE) 5 MG Tab Take 1 Tab by mouth every four hours as needed. 30 Tab 0   • acetaminophen (TYLENOL) 325 MG Tab Take 1 Tab by mouth every four hours as needed for Mild Pain. 30 Tab 0   • alprazolam (XANAX) 0.25 MG Tab Take 1 Tab by mouth every 12 hours as needed for Anxiety. 20 Tab 0     No facility-administered encounter medications on file as of 6/23/2017.     Review of Systems   Constitutional: Negative.  Negative for fever, chills and malaise/fatigue.   HENT: Negative.  Negative for sore throat.    Eyes: Negative.    Respiratory: Negative.  Negative for cough, hemoptysis, sputum production, shortness of breath, wheezing and stridor.    Cardiovascular: Negative.  Negative for chest pain, palpitations, orthopnea, claudication, leg swelling and PND.   Gastrointestinal: Negative.    Genitourinary: Negative.    Musculoskeletal: Negative.    Skin: Negative.    Neurological: Negative.  Negative for dizziness, loss of consciousness and weakness.   Endo/Heme/Allergies: Negative.  Does not bruise/bleed easily.   All other systems reviewed and are negative.       Objective:   BP 90/60 mmHg  Pulse 60  Ht 1.727 m (5' 8\")  Wt 63.504 kg (140 lb)  BMI 21.29 kg/m2    Physical Exam   Constitutional: He is oriented to person, place, and time. He appears well-developed and well-nourished. No " distress.   HENT:   Head: Normocephalic.   Mouth/Throat: Oropharynx is clear and moist.   Eyes: EOM are normal. Pupils are equal, round, and reactive to light. Right eye exhibits no discharge. Left eye exhibits no discharge. No scleral icterus.   Neck: Normal range of motion. Neck supple. No JVD present. No tracheal deviation present.   Cardiovascular: Normal rate, regular rhythm, S1 normal, S2 normal, normal heart sounds, intact distal pulses and normal pulses.  Exam reveals no gallop, no S3, no S4 and no friction rub.    No murmur heard.   No systolic murmur is present    No diastolic murmur is present   Pulses:       Carotid pulses are 2+ on the right side, and 2+ on the left side.       Radial pulses are 2+ on the right side, and 2+ on the left side.        Dorsalis pedis pulses are 2+ on the right side, and 2+ on the left side.        Posterior tibial pulses are 2+ on the right side, and 2+ on the left side.   Pulmonary/Chest: Effort normal and breath sounds normal. No respiratory distress. He has no wheezes. He has no rales.   Abdominal: Soft. Bowel sounds are normal. He exhibits no distension and no mass. There is no tenderness. There is no rebound and no guarding.   Musculoskeletal: He exhibits no edema.   Neurological: He is alert and oriented to person, place, and time. No cranial nerve deficit.   Skin: Skin is warm and dry. He is not diaphoretic. No pallor.   Psychiatric: He has a normal mood and affect. His behavior is normal. Judgment and thought content normal.   Nursing note and vitals reviewed.      Assessment:     1. ST elevation myocardial infarction involving left circumflex coronary artery (CMS-[facility])     2. Normocytic anemia     3. Chronic obstructive pulmonary disease, unspecified COPD type (CMS-[facility])     4. Coronary artery disease involving native coronary artery of native heart without angina pectoris     5. Presence of bare metal stent in left circumflex coronary artery     6. Paroxysmal atrial  fibrillation (CMS-[facility])         Medical Decision Making:  Today's Assessment / Status / Plan:     72 y/o M with p-a-fib and CAD s/ STEMI.  He will stay on cloipidogrel for one year.  He will  a sample Rx for Xarelto.  We will check his LFTs and TSh today.    1. STEMI, inferior    - cont clopidogrel until 8/2017    - cont atorva 40    2. HLD    - per above    3. A-fib    - cont xarelto    - contn amio    - they will advise if he wants to switch    4. Tob Use    - stop smoking    Thank for you allowing me to take part in your patient's care, please call should you have any questions or would like to discuss this patient.

## 2017-08-25 ENCOUNTER — TELEPHONE (OUTPATIENT)
Dept: CARDIOLOGY | Facility: MEDICAL CENTER | Age: 72
End: 2017-08-25

## 2017-08-25 DIAGNOSIS — I48.91 ATRIAL FIBRILLATION, UNSPECIFIED TYPE (HCC): ICD-10-CM

## 2017-08-25 DIAGNOSIS — I48.0 PAF (PAROXYSMAL ATRIAL FIBRILLATION) (HCC): ICD-10-CM

## 2017-08-25 NOTE — TELEPHONE ENCOUNTER
Xarelto Rx issues  Received: Today       Wilder Quintanilla, [place] Ass't  Dianelys MElver Pizarro R.N.       Phone Number: [phone]                     PERRY Michelleise,     Pt's daughter Cheryle called and stated that her father will be running out of his Xarelto on  night.     She says that the clinic at [address] [place] [address] is wanting another RX refill. When the bottle says 2 more refills on it.     She would like a call back at: [phone].              Returned call. Informed we could have a new Rx available for Monday AM when they come to [place]. She states pt refuses to take coumadin because that is how her Mom  and they've tried them all, they are all just as expensive and he can't afford these medications. Discussed with PERRY, he will discuss with patient at next visit. Pts last dose of Plavix is this evening per pt/daughter report. Pt remains on aspirin and xarelto.

## 2017-08-28 ENCOUNTER — TELEPHONE (OUTPATIENT)
Dept: CARDIOLOGY | Facility: MEDICAL CENTER | Age: 72
End: 2017-08-28

## 2017-08-28 NOTE — TELEPHONE ENCOUNTER
1100   Pt and daughter to lobby to  Xarelto sample Rx  Med prob list and current med list brought with them so I can update his EMR here.  Discussion about the affordability of his medications. Pt is against coumadin. Resources given. Pt has a month worth of xarelto, will discuss and advise us what they have decided.